# Patient Record
Sex: MALE | Race: WHITE | Employment: FULL TIME | URBAN - METROPOLITAN AREA
[De-identification: names, ages, dates, MRNs, and addresses within clinical notes are randomized per-mention and may not be internally consistent; named-entity substitution may affect disease eponyms.]

---

## 2017-09-21 PROBLEM — Z87.891 HISTORY OF TOBACCO USE: Status: ACTIVE | Noted: 2017-09-21

## 2017-09-21 PROBLEM — I74.5 ILIAC ARTERY OCCLUSION, LEFT (HCC): Status: ACTIVE | Noted: 2017-09-21

## 2017-11-29 PROBLEM — M51.16 INTERVERTEBRAL DISC DISORDERS WITH RADICULOPATHY, LUMBAR REGION: Status: ACTIVE | Noted: 2017-11-29

## 2018-07-05 ENCOUNTER — OFFICE VISIT (OUTPATIENT)
Dept: VASCULAR SURGERY | Age: 53
End: 2018-07-05
Payer: COMMERCIAL

## 2018-07-05 DIAGNOSIS — I73.9 PVD (PERIPHERAL VASCULAR DISEASE) WITH CLAUDICATION (HCC): ICD-10-CM

## 2018-07-05 DIAGNOSIS — I74.5 ILIAC ARTERY OCCLUSION, LEFT (HCC): Primary | ICD-10-CM

## 2018-07-05 DIAGNOSIS — Z87.891 HISTORY OF TOBACCO USE: ICD-10-CM

## 2018-07-05 PROCEDURE — 99213 OFFICE O/P EST LOW 20 MIN: CPT | Performed by: SURGERY

## 2018-07-05 RX ORDER — M-VIT,TX,IRON,MINS/CALC/FOLIC 27MG-0.4MG
1 TABLET ORAL DAILY
COMMUNITY

## 2018-11-16 ENCOUNTER — OFFICE VISIT (OUTPATIENT)
Dept: PRIMARY CARE CLINIC | Age: 53
End: 2018-11-16
Payer: COMMERCIAL

## 2018-11-16 VITALS
SYSTOLIC BLOOD PRESSURE: 138 MMHG | HEART RATE: 82 BPM | DIASTOLIC BLOOD PRESSURE: 80 MMHG | WEIGHT: 171 LBS | OXYGEN SATURATION: 97 % | BODY MASS INDEX: 25.25 KG/M2

## 2018-11-16 DIAGNOSIS — M54.5 CHRONIC LOW BACK PAIN, UNSPECIFIED BACK PAIN LATERALITY, WITH SCIATICA PRESENCE UNSPECIFIED: Primary | ICD-10-CM

## 2018-11-16 DIAGNOSIS — G89.29 CHRONIC LOW BACK PAIN, UNSPECIFIED BACK PAIN LATERALITY, WITH SCIATICA PRESENCE UNSPECIFIED: Primary | ICD-10-CM

## 2018-11-16 DIAGNOSIS — N28.1 RENAL CYST: ICD-10-CM

## 2018-11-16 PROCEDURE — 99213 OFFICE O/P EST LOW 20 MIN: CPT | Performed by: INTERNAL MEDICINE

## 2018-11-16 ASSESSMENT — PATIENT HEALTH QUESTIONNAIRE - PHQ9
1. LITTLE INTEREST OR PLEASURE IN DOING THINGS: 1
SUM OF ALL RESPONSES TO PHQ9 QUESTIONS 1 & 2: 2
2. FEELING DOWN, DEPRESSED OR HOPELESS: 1
SUM OF ALL RESPONSES TO PHQ QUESTIONS 1-9: 2
SUM OF ALL RESPONSES TO PHQ QUESTIONS 1-9: 2

## 2018-11-16 ASSESSMENT — ENCOUNTER SYMPTOMS
EYE DISCHARGE: 0
SHORTNESS OF BREATH: 0
ABDOMINAL PAIN: 0
NAUSEA: 0
BLOOD IN STOOL: 0

## 2018-11-16 NOTE — PROGRESS NOTES
Chief Complaint   Patient presents with    Other     Dr. Torres People NP Milena Becker advised him  to come see you , because of an indicental finding of a cyst on his kidney.  Back Pain     Would like a referral to a doctor not pain management for his back pain. Likes Dr. Mariam Cast. HPI:  Patient is here for follow-up     Allergy and Medications are reviewed and updated. Past Medical History, Surgical History, and Family History has been reviewed and updated. Review of Systems:  Review of Systems   Constitutional: Negative for chills and fever. HENT: Negative for congestion and ear pain. Eyes: Negative for discharge. Respiratory: Negative for shortness of breath (No new SOB). Cardiovascular: Negative for chest pain and leg swelling. Gastrointestinal: Negative for abdominal pain, blood in stool and nausea. Endocrine: Negative for polydipsia. Genitourinary: Negative for flank pain and hematuria. Musculoskeletal: Negative for myalgias and neck pain. Skin: Negative for rash. Neurological: Negative for dizziness and seizures. Hematological: Does not bruise/bleed easily. Psychiatric/Behavioral: Negative for hallucinations and suicidal ideas. Vitals:    11/16/18 1653   BP: 138/80   Pulse: 82   SpO2: 97%   Weight: 171 lb (77.6 kg)       Physical Exam   Constitutional: He is oriented to person, place, and time. He appears well-developed and well-nourished. HENT:   Head: Normocephalic and atraumatic. Mouth/Throat: Oropharynx is clear and moist.   Eyes: Pupils are equal, round, and reactive to light. Conjunctivae are normal.   Neck: No JVD present. Cardiovascular: Normal rate and regular rhythm. Pulmonary/Chest: Effort normal and breath sounds normal. He has no rales. Abdominal: Soft. Bowel sounds are normal.   Musculoskeletal: Normal range of motion. Lymphadenopathy:     He has no cervical adenopathy. Neurological: He is alert and oriented to person, place, and time.    Skin:

## 2018-12-11 DIAGNOSIS — N28.1 RENAL CYST: ICD-10-CM

## 2019-06-28 DIAGNOSIS — Z12.11 SCREENING FOR COLON CANCER: Primary | ICD-10-CM

## 2019-06-28 LAB
CONTROL: NORMAL
HEMOCCULT STL QL: NEGATIVE

## 2019-06-28 PROCEDURE — 82274 ASSAY TEST FOR BLOOD FECAL: CPT | Performed by: INTERNAL MEDICINE

## 2020-03-09 ENCOUNTER — TELEPHONE (OUTPATIENT)
Dept: ADMINISTRATIVE | Age: 55
End: 2020-03-09

## 2020-03-10 ENCOUNTER — OFFICE VISIT (OUTPATIENT)
Dept: FAMILY MEDICINE CLINIC | Age: 55
End: 2020-03-10
Payer: COMMERCIAL

## 2020-03-10 VITALS
SYSTOLIC BLOOD PRESSURE: 144 MMHG | HEIGHT: 69 IN | RESPIRATION RATE: 18 BRPM | DIASTOLIC BLOOD PRESSURE: 78 MMHG | WEIGHT: 177 LBS | HEART RATE: 71 BPM | OXYGEN SATURATION: 98 % | BODY MASS INDEX: 26.22 KG/M2

## 2020-03-10 PROCEDURE — 99213 OFFICE O/P EST LOW 20 MIN: CPT | Performed by: INTERNAL MEDICINE

## 2020-03-10 RX ORDER — BUPRENORPHINE AND NALOXONE 8; 2 MG/1; MG/1
FILM, SOLUBLE BUCCAL; SUBLINGUAL
COMMUNITY
Start: 2020-02-17

## 2020-03-10 ASSESSMENT — ENCOUNTER SYMPTOMS
SHORTNESS OF BREATH: 0
EYE DISCHARGE: 0
BLOOD IN STOOL: 0
ABDOMINAL PAIN: 0
NAUSEA: 0
BACK PAIN: 1

## 2020-03-10 ASSESSMENT — PATIENT HEALTH QUESTIONNAIRE - PHQ9
SUM OF ALL RESPONSES TO PHQ9 QUESTIONS 1 & 2: 2
2. FEELING DOWN, DEPRESSED OR HOPELESS: 1
SUM OF ALL RESPONSES TO PHQ QUESTIONS 1-9: 2
SUM OF ALL RESPONSES TO PHQ QUESTIONS 1-9: 2
1. LITTLE INTEREST OR PLEASURE IN DOING THINGS: 1

## 2020-03-10 NOTE — PROGRESS NOTES
No chief complaint on file. HPI:  Patient is here   Pt has ch neck pain, following with Dr Ashanti Downs    On Suboxone    Now comes here because pain- not improving    Pt was last seen by me in 2018. Allergy and Medications are reviewed and updated. Past Medical History, Surgical History, and Family History has been reviewed and updated. Review of Systems:  Review of Systems   Constitutional: Negative for chills and fever. HENT: Negative for congestion and ear pain. Eyes: Negative for discharge. Respiratory: Negative for shortness of breath (No new SOB). Cardiovascular: Negative for chest pain and leg swelling. Gastrointestinal: Negative for abdominal pain, blood in stool and nausea. Endocrine: Negative for polydipsia. Genitourinary: Negative for flank pain and hematuria. Musculoskeletal: Positive for back pain (Ch neck pain). Negative for myalgias and neck pain. Skin: Negative for rash. Neurological: Negative for dizziness and seizures. Hematological: Does not bruise/bleed easily. Psychiatric/Behavioral: Negative for hallucinations and suicidal ideas. Vitals:    03/10/20 1201 03/10/20 1207   BP: (!) 146/78 (!) 144/78   Pulse: 71    Resp: 18    SpO2: 98%    Weight: 177 lb (80.3 kg)    Height: 5' 9\" (1.753 m)        Physical Exam  Vitals signs reviewed. Constitutional:       Appearance: He is well-developed. HENT:      Head: Normocephalic and atraumatic. Eyes:      Conjunctiva/sclera: Conjunctivae normal.      Pupils: Pupils are equal, round, and reactive to light. Neck:      Vascular: No JVD. Cardiovascular:      Rate and Rhythm: Normal rate and regular rhythm. Pulmonary:      Effort: Pulmonary effort is normal.      Breath sounds: Normal breath sounds. No rales. Abdominal:      General: Bowel sounds are normal.      Palpations: Abdomen is soft. Musculoskeletal:      Comments: Ch neckpain   Lymphadenopathy:      Cervical: No cervical adenopathy. Skin:     General: Skin is warm and dry. Neurological:      Mental Status: He is alert and oriented to person, place, and time. Psychiatric:         Behavior: Behavior normal.          Labs :      Lab Results   Component Value Date    LABPH 0 08/27/2018           ASSESSMENT     Patient Active Problem List    Diagnosis Date Noted    PVD (peripheral vascular disease) with claudication ([facility] [address] 07/05/2018    Intervertebral disc disorders with radiculopathy, lumbar region 11/29/2017    Iliac artery occlusion, left ([facility] [address] 09/21/2017    History of tobacco use 09/21/2017    Peripheral neuropathy due to ischemia 08/02/2016    Other constipation from medication 04/10/2014    Osteoarthritis of spine with radiculopathy, lumbar region 11/08/2013    Osteoarthritis 11/08/2013    Cervical radiculopathy 02/18/2013    Cervical disc displacement 02/18/2013    Disc displacement, lumbar 02/18/2013    Lumbar radiculopathy 02/18/2013    Spondylolisthesis of lumbar region 02/18/2013    Sprain of neck 07/25/2012    Sprain of lumbar region 07/25/2012    Brachial neuritis or radiculitis 07/25/2012    Thoracic or lumbosacral neuritis or radiculitis, unspecified 07/25/2012    Displacement of cervical intervertebral disc without myelopathy 07/25/2012        Diagnosis:     ICD-10-CM    1. Chronic neck pain M54.2 XR CERVICAL SPINE (4-5 VIEWS)    G89.29        PLAN:        C spine XR    Follow up with his pain management physician     Low salt, Low Carb diet an low fat diet  Continue medications as advised and take them regularly  Regular exercises as advised    Discussed natural and expected course of this diagnosis and need to alert me if symptoms do not follow expected course, or if any worse. Smoking cessation if applicable, discussed with patient. There are no Patient Instructions on file for this visit. Return in about 2 weeks (around 3/24/2020).

## 2021-02-10 ENCOUNTER — TELEPHONE (OUTPATIENT)
Dept: VASCULAR SURGERY | Age: 56
End: 2021-02-10

## 2021-02-11 ENCOUNTER — OFFICE VISIT (OUTPATIENT)
Dept: VASCULAR SURGERY | Age: 56
End: 2021-02-11
Payer: COMMERCIAL

## 2021-02-11 VITALS — BODY MASS INDEX: 28.29 KG/M2 | WEIGHT: 191 LBS | HEIGHT: 69 IN

## 2021-02-11 DIAGNOSIS — I74.5 ILIAC ARTERY OCCLUSION, LEFT (HCC): ICD-10-CM

## 2021-02-11 DIAGNOSIS — M79.89 LEG SWELLING: ICD-10-CM

## 2021-02-11 DIAGNOSIS — I73.9 PVD (PERIPHERAL VASCULAR DISEASE) WITH CLAUDICATION (HCC): Primary | ICD-10-CM

## 2021-02-11 DIAGNOSIS — Z87.891 HISTORY OF TOBACCO USE: ICD-10-CM

## 2021-02-11 PROCEDURE — 99204 OFFICE O/P NEW MOD 45 MIN: CPT | Performed by: SURGERY

## 2021-02-11 NOTE — PROGRESS NOTES
 Bilateral swelling of feet     Cervical disc displacement     Chronic lumbar radiculopathy     History of tobacco use 9/21/2017    Iliac artery occlusion, left (HCC) 9/21/2017    Leg swelling 2/11/2021    Spondylisthesis     LUMBAR       History reviewed. No pertinent surgical history. History reviewed. No pertinent family history.     Social History     Socioeconomic History    Marital status:      Spouse name: Not on file    Number of children: Not on file    Years of education: Not on file    Highest education level: Not on file   Occupational History    Not on file   Social Needs    Financial resource strain: Not on file    Food insecurity     Worry: Not on file     Inability: Not on file    Transportation needs     Medical: Not on file     Non-medical: Not on file   Tobacco Use    Smoking status: Former Smoker     Packs/day: 1.00     Years: 0.50     Pack years: 0.50     Types: Cigarettes     Start date: 10/16/2018    Smokeless tobacco: Never Used   Substance and Sexual Activity    Alcohol use: No    Drug use: No    Sexual activity: Not on file   Lifestyle    Physical activity     Days per week: Not on file     Minutes per session: Not on file    Stress: Not on file   Relationships    Social connections     Talks on phone: Not on file     Gets together: Not on file     Attends Jainism service: Not on file     Active member of club or organization: Not on file     Attends meetings of clubs or organizations: Not on file     Relationship status: Not on file    Intimate partner violence     Fear of current or ex partner: Not on file     Emotionally abused: Not on file     Physically abused: Not on file     Forced sexual activity: Not on file   Other Topics Concern    Not on file   Social History Narrative    Not on file       Review of Systems:  Skin:  No abnormal pigmentation or rash  Eyes:  No blurring, diplopia or vision loss  Ears/Nose/Throat:  No hearing loss or vertigo Respiratory:  No cough, pleuritic chest pain, dyspnea, or wheezing. History of tobacco use cardiovascular: No angina, palpitations . Gastrointestinal:  No nausea or vomiting; no abdominal pain or rectal bleeding  Musculoskeletal:  No arthritis or weakness. Significant musculoskeletal issues involving the thoracolumbar spine including cervical spine with osteoarthritis disc disease etc.  Neurologic:  No paralysis, paresis, paresthesia, seizures or headaches  Hematologic/Lymphatic/Immunologic:  No anemia, abnormal bleeding/bruising, fever, chills or night sweats. Endocrine:  No heat or cold intolerance. No polyphagia, polydipsia or polyuria. Physical Exam:  General appearance:  Alert, awake, oriented x 3. No distress. Skin:  Warm and dry  Head:  Normocephalic. No masses, lesions or tenderness  Eyes:  Conjunctivae appear normal; PERRL  Ears:  External ears normal  Nose/Sinuses:  Septum midline, mucosa normal; no drainage  Oropharynx:  Clear, no exudate noted  Neck:  No jugular venous distention, lymphadenopathy or thyromegaly. No evidence of carotid bruit  Lungs:  Clear to ausculation bilaterally. No rhonchi, crackles, wheezes  Heart:  Regular rate and rhythm. No rub or murmur  Abdomen:  Soft, non-tender. No masses, organomegaly. Musculoskeletal : No joint effusions, tenderness swelling    Neuro: Speech is intact. Moving all extremities. No focal motor or sensory deficits      Extremities:  Both feet are warm to touch. The color of both feet is normal.    Mild edema of both legs noted, without any tenderness of the calf    Pulses Right  Left    Brachial 3 3    Radial    3=normal   Femoral 2 1  2=diminished   Popliteal    1=barely palpable   Dorsalis pedis 1 0  0=absent   Posterior tibial    4=aneurysmal             Other pertinent information:1. The past medical records were reviewed.     2.  Recent lab work, including CMP were reviewed, satisfactory albumin level of 4    Assessment: 1. PVD (peripheral vascular disease) with claudication (HCC)    2. Leg swelling    3. Iliac artery occlusion, left (HCC)    4. History of tobacco use              Plan:       Detailed discussion with patient, options risks benefits and alternatives were explained, patient was recommended work-up as outlined below, wear compression stockings for mild swelling of the legs, call as needed if any increasing symptoms of pain and swelling of the legs in the future, continue the low-dose aspirin          Patient was instructed to continue walking program and to call if any worsening of symptoms and to call if any focal lateralizing neurological symptoms like loss of speech, vision or loss of function of extremity. All the questions were answered. Orders Placed This Encounter   Procedures    US DUP LOWER EXTREMITIES BILATERAL VENOUS    VL LOWER EXTREMITY ARTERIAL SEGMENTAL PRESSURES W PPG     Orders Placed This Encounter   Medications    Elastic Bandages & Supports (JOBST KNEE HIGH COMPRESSION ) MISC     Sig: Knee high with 20- 30 mmhg of compression     Dispense:  1 each     Refill:  10    Elastic Bandages & Supports (JOBST KNEE HIGH COMPRESSION SM) MISC     Sig: Knee high with 20- 30 mmhg of compression     Dispense:  1 each     Refill:  10           Indicated follow-up: Return if symptoms worsen or fail to improve.

## 2021-03-12 ENCOUNTER — HOSPITAL ENCOUNTER (OUTPATIENT)
Dept: CARDIOLOGY | Age: 56
Discharge: HOME | End: 2021-03-12
Payer: COMMERCIAL

## 2021-03-12 DIAGNOSIS — I74.5 ILIAC ARTERY OCCLUSION, LEFT (HCC): ICD-10-CM

## 2021-03-12 DIAGNOSIS — I73.9 PVD (PERIPHERAL VASCULAR DISEASE) WITH CLAUDICATION (HCC): ICD-10-CM

## 2021-03-12 DIAGNOSIS — M79.89 LEG SWELLING: ICD-10-CM

## 2021-03-12 PROCEDURE — 93923 UPR/LXTR ART STDY 3+ LVLS: CPT

## 2021-03-12 PROCEDURE — 93970 EXTREMITY STUDY: CPT

## 2021-03-12 NOTE — PROGRESS NOTES
[facility] - [facility]    This is a pre read worksheet - prior to official physician interpretation    Nathan Alcantar  1965  Date of study:3/12/21    Indication for study:  Leg pain and swelling  Study : Bilateral Lower Extremity Venous Duplex Examination    Duplex examination of the common, deep, superficial femoral, and the popliteal veins of the RIGHT lower extremity identifies spontaneous flow. All scanned veins are compressible and free of echogenic densities. Duplex examination of the common, deep, superficial femoral, and the popliteal veins of the LEFT lower extremity identifies spontaneous flow. All scanned veins are compressible and free of echogenic densities.     Additional comments: Negative DVT

## 2021-03-15 ENCOUNTER — TELEPHONE (OUTPATIENT)
Dept: VASCULAR SURGERY | Age: 56
End: 2021-03-15

## 2021-03-15 NOTE — PROCEDURES
[address]                  [address] [facility],  [facility]                                VASCULAR REPORT    PATIENT NAME: Mercedes Johnson                    :        1965  MED REC NO:   [id number]                            ROOM:  ACCOUNT NO:   [de-identified]                           ADMIT DATE: 2021  PROVIDER:     Arcelia Almendarez MD    DATE OF PROCEDURE:  2021    LOWER EXTREMITY ARTERIAL DOPPLER STUDY    INDICATION:  Difficulty walking and discomfort over the left leg. FINDINGS:  The lower extremity arterial Doppler study revealed that the  patient does have Doppler evidence of left iliac artery occlusive  disease with an ankle-brachial index of 0.64. On the right side, the  ankle-brachial index was normal range. IMPRESSION:  Left iliac artery occlusive disease with an ankle-brachial  index of 0.64.         Nikhil Monsalve MD    D: 2021 20:34:16       T: 2021 22:45:54     [email or web address]  Job#: [id number]     Doc#: [id number]    CC:  Ingrid Odom MD

## 2021-03-15 NOTE — PROCEDURES
[address]                  [address] [facility],  [facility]                                VASCULAR REPORT    PATIENT NAME: Hazel Hernandez                    :        1965  MED REC NO:   [id number]                            ROOM:  ACCOUNT NO:   [de-identified]                           ADMIT DATE: 2021  PROVIDER:     Fabien Anton MD    DATE OF PROCEDURE:  2021    INDICATION:  Pain in the left leg with some swelling in the legs  bilaterally. Venous duplex of the right leg revealed that all the major  deep veins are patent with normal color flow and normal compression  without evidence of deep vein thrombosis. Venous duplex of the left leg  revealed that all the major deep veins are patent, normal color flow,  normal compression without evidence of deep vein thrombosis. IMPRESSION:  Normal venous ultrasound without evidence of deep vein  thrombosis.         Berenice Dow MD    D: 2021 20:35:10       T: 2021 20:37:51     [email or web address]  Job#: [id number]     Doc#: [id number]    CC:  Winifred Nieves MD

## 2021-04-02 ENCOUNTER — TELEPHONE (OUTPATIENT)
Dept: VASCULAR SURGERY | Age: 56
End: 2021-04-02

## 2021-04-02 NOTE — TELEPHONE ENCOUNTER
Patient was called again, and again, no answer, unable to leave a message because the voicemail is full, will request our office to reattempt to contact the patient

## 2021-04-05 ENCOUNTER — TELEPHONE (OUTPATIENT)
Dept: VASCULAR SURGERY | Age: 56
End: 2021-04-05

## 2021-04-07 ENCOUNTER — TELEPHONE (OUTPATIENT)
Dept: VASCULAR SURGERY | Age: 56
End: 2021-04-07

## 2021-04-07 NOTE — TELEPHONE ENCOUNTER
Spoke with the pt regarding venous and arterial test results; continue walking as much as possible, continue baby asa, follow up with Dr. Belia Manrique x 1 year.

## 2025-05-29 ENCOUNTER — OFFICE VISIT (OUTPATIENT)
Dept: VASCULAR SURGERY | Age: 60
End: 2025-05-29
Payer: COMMERCIAL

## 2025-05-29 DIAGNOSIS — I73.9 PVD (PERIPHERAL VASCULAR DISEASE) WITH CLAUDICATION: ICD-10-CM

## 2025-05-29 DIAGNOSIS — B35.3 TINEA PEDIS OF BOTH FEET: ICD-10-CM

## 2025-05-29 DIAGNOSIS — I89.0 LYMPHEDEMA OF BOTH LOWER EXTREMITIES: Primary | ICD-10-CM

## 2025-05-29 DIAGNOSIS — M79.89 LEG SWELLING: ICD-10-CM

## 2025-05-29 DIAGNOSIS — B35.9 DERMATOPHYTOSIS: ICD-10-CM

## 2025-05-29 DIAGNOSIS — F17.290 VAPING NICOTINE DEPENDENCE, TOBACCO PRODUCT: ICD-10-CM

## 2025-05-29 PROCEDURE — 99204 OFFICE O/P NEW MOD 45 MIN: CPT | Performed by: SURGERY

## 2025-05-29 RX ORDER — BUPRENORPHINE 8 MG/1
TABLET SUBLINGUAL DAILY
COMMUNITY

## 2025-05-29 RX ORDER — LOSARTAN POTASSIUM 50 MG/1
50 TABLET ORAL DAILY
COMMUNITY
Start: 2025-04-27

## 2025-05-29 RX ORDER — FUROSEMIDE 20 MG/1
TABLET ORAL
COMMUNITY
Start: 2025-03-31

## 2025-05-29 RX ORDER — TERBINAFINE HYDROCHLORIDE 250 MG/1
250 TABLET ORAL DAILY
Qty: 14 TABLET | Refills: 0 | Status: SHIPPED | OUTPATIENT
Start: 2025-05-29 | End: 2025-06-12

## 2025-05-29 NOTE — PROGRESS NOTES
Other Topics Concern    Not on file   Social History Narrative    Not on file     Social Drivers of Health     Financial Resource Strain: Not on file   Food Insecurity: Not on file   Transportation Needs: Not on file   Physical Activity: Not on file   Stress: Not on file   Social Connections: Not on file   Intimate Partner Violence: Not on file   Housing Stability: Not on file       Review of Systems:  Skin:  No abnormal pigmentation or rash  Eyes:  No blurring, diplopia or vision loss  Ears/Nose/Throat:  No hearing loss or vertigo  Respiratory:  No cough, pleuritic chest pain, dyspnea, or wheezing.  Cardiovascular: No angina, palpitations .  Gastrointestinal:  No nausea or vomiting; no abdominal pain or rectal bleeding  Musculoskeletal:  No arthritis or weakness.  Neurologic:  No paralysis, paresis, paresthesia, seizures or headaches  Hematologic/Lymphatic/Immunologic:  No anemia, abnormal bleeding/bruising, fever, chills or night sweats.  Endocrine:  No heat or cold intolerance.  No polyphagia, polydipsia or polyuria.      Physical Exam:  General appearance:  Alert, awake, oriented x 3.  No distress.  Skin:  Warm and dry  Head:  Normocephalic.  No masses, lesions or tenderness  Eyes:  Conjunctivae appear normal; PERRL  Ears:  External ears normal  Nose/Sinuses:  Septum midline, mucosa normal; no drainage  Oropharynx:  Clear, no exudate noted  Neck:  No jugular venous distention, lymphadenopathy or thyromegaly.  No evidence of carotid bruit  Lungs:  Clear to ausculation bilaterally.  No rhonchi, crackles, wheezes  Heart:  Regular rate and rhythm.  No rub or murmur  Abdomen:  Soft, non-tender.  No masses, organomegaly.  Musculoskeletal : No joint effusions, tenderness swelling    Neuro: Speech is intact. Moving all extremities. No focal motor or sensory deficits      Extremities:  Both feet are warm to touch. The color of both feet is normal.    Patient has a moderate swelling of both legs clinically consistent

## 2025-06-03 ENCOUNTER — TELEPHONE (OUTPATIENT)
Dept: VASCULAR SURGERY | Age: 60
End: 2025-06-03

## 2025-06-03 NOTE — TELEPHONE ENCOUNTER
I spoke with Jt . He has an appointment at [facility] on July 21,2025. It is at 12:30 pm. to register for a 1:00 pm.[employer]. He is to bring his list of medications, photo Id., and insurance cards. I tried finding him an earlier date but none were available.

## 2025-07-18 ENCOUNTER — TELEPHONE (OUTPATIENT)
Dept: INTERVENTIONAL RADIOLOGY/VASCULAR | Age: 60
End: 2025-07-18

## 2025-07-18 NOTE — TELEPHONE ENCOUNTER
Unable to reach patient by home or work number to remind patient of appointment for 07/21/2025 as message function is full and cannot receive messages and alternate number (work) is no longer in service.

## 2025-07-21 ENCOUNTER — CLINICAL DOCUMENTATION (OUTPATIENT)
Dept: VASCULAR SURGERY | Age: 60
End: 2025-07-21

## 2025-07-21 ENCOUNTER — HOSPITAL ENCOUNTER (OUTPATIENT)
Dept: INTERVENTIONAL RADIOLOGY/VASCULAR | Age: 60
Discharge: HOME | End: 2025-07-23
Payer: COMMERCIAL

## 2025-07-21 DIAGNOSIS — I89.0 LYMPHEDEMA OF BOTH LOWER EXTREMITIES: ICD-10-CM

## 2025-07-21 DIAGNOSIS — B35.3 TINEA PEDIS OF BOTH FEET: ICD-10-CM

## 2025-07-21 DIAGNOSIS — M79.89 LEG SWELLING: ICD-10-CM

## 2025-07-21 DIAGNOSIS — I73.9 PVD (PERIPHERAL VASCULAR DISEASE) WITH CLAUDICATION: ICD-10-CM

## 2025-07-21 DIAGNOSIS — B35.9 DERMATOPHYTOSIS: ICD-10-CM

## 2025-07-21 DIAGNOSIS — M79.89 LEG SWELLING: Primary | ICD-10-CM

## 2025-07-21 PROCEDURE — 93970 EXTREMITY STUDY: CPT

## 2025-07-21 PROCEDURE — 93923 UPR/LXTR ART STDY 3+ LVLS: CPT

## 2025-07-21 NOTE — PROGRESS NOTES
The patient's vascular testing, both arterial and venous studies were personally reviewed by me    1.  Lower extremity arterial Doppler study revealed on the right side, normal ankle Minadex with triphasic ankle Doppler tracings, on the left side Doppler evidence of left iliac artery occlusive disease with almost biphasic ankle Doppler tracings with an ankle-brachial index of 0.70    2.  The venous ultrasound study: Bilaterally normal, no DVT    Because of swelling of the legs due to lymphedema, consider lymphedema therapy    I attempted to call the patient, no answer, voicemail full, unable to leave a message

## 2025-07-23 ENCOUNTER — TELEPHONE (OUTPATIENT)
Dept: VASCULAR SURGERY | Age: 60
End: 2025-07-23

## 2025-07-23 NOTE — TELEPHONE ENCOUNTER
The patient's vascular testing, both arterial and venous studies were personally reviewed by me     1.  Lower extremity arterial Doppler study revealed on the right side, normal ankle  with triphasic ankle Doppler tracings, on the left side Doppler evidence of left iliac artery occlusive disease with almost biphasic ankle Doppler tracings with an ankle-brachial index of 0.70     2.  The venous ultrasound study: Bilaterally normal, no DVT     Because of swelling of the legs due to lymphedema, consider lymphedema therapy     I attempted to call the patient, again today no answer, voicemail full, unable to leave a message    The home number that was listed in the chart, is not connected, wrong member not in service anymore    I will request our office to reach out to the patient to facilitate lymphedema therapy and also consider getting him an appointment see me back in 1 year to monitor his peripheral vascular disease or left leg that remained stable for the last 4 years

## 2025-07-25 ENCOUNTER — TELEPHONE (OUTPATIENT)
Dept: VASCULAR SURGERY | Age: 60
End: 2025-07-25

## 2025-07-25 DIAGNOSIS — I89.0 LYMPHEDEMA OF BOTH LOWER EXTREMITIES: Primary | ICD-10-CM

## 2025-07-25 NOTE — TELEPHONE ENCOUNTER
Notified patient of test results and that Dr. Mcguire recommends lymphedema therapy.   He states he doesn't think his home phone has an answering machine but he will look at caller ID and call back.   He cannot use his cell phone at work.   Will fax an order for lymphedema therapy, he will check his caller ID and return the call.